# Patient Record
Sex: MALE | Race: WHITE | NOT HISPANIC OR LATINO | URBAN - METROPOLITAN AREA
[De-identification: names, ages, dates, MRNs, and addresses within clinical notes are randomized per-mention and may not be internally consistent; named-entity substitution may affect disease eponyms.]

---

## 2018-04-25 LAB
ALBUMIN SERPL-MCNC: 5 G/DL (ref 3.5–5.5)
ALBUMIN/GLOB SERPL: 2 {RATIO} (ref 1.2–2.2)
ALP SERPL-CCNC: 47 IU/L (ref 39–117)
ALT SERPL-CCNC: 27 IU/L (ref 0–44)
AST SERPL-CCNC: 19 IU/L (ref 0–40)
BILIRUB SERPL-MCNC: 0.8 MG/DL (ref 0–1.2)
BUN SERPL-MCNC: 13 MG/DL (ref 6–24)
BUN/CREAT SERPL: 15 (ref 9–20)
C PEPTIDE SERPL-MCNC: 3.8 NG/ML (ref 1.1–4.4)
CALCIUM SERPL-MCNC: 10 MG/DL (ref 8.7–10.2)
CHLORIDE SERPL-SCNC: 100 MMOL/L (ref 96–106)
CHOLEST SERPL-MCNC: 200 MG/DL (ref 100–199)
CO2 SERPL-SCNC: 23 MMOL/L (ref 18–29)
CREAT SERPL-MCNC: 0.88 MG/DL (ref 0.76–1.27)
GFR SERPLBLD CREATININE-BSD FMLA CKD-EPI: 100 ML/MIN/1.73
GFR SERPLBLD CREATININE-BSD FMLA CKD-EPI: 116 ML/MIN/1.73
GLOBULIN SER CALC-MCNC: 2.5 G/DL (ref 1.5–4.5)
GLUCOSE SERPL-MCNC: 111 MG/DL (ref 65–99)
HBA1C MFR BLD: 5.8 % (ref 4.8–5.6)
HDLC SERPL-MCNC: 33 MG/DL
LABCORP AMBIG ABBREV: NORMAL
LABCORP AMBIG ABBREV: NORMAL
LDLC SERPL CALC-MCNC: 132 MG/DL (ref 0–99)
POTASSIUM SERPL-SCNC: 5.3 MMOL/L (ref 3.5–5.2)
PROT SERPL-MCNC: 7.5 G/DL (ref 6–8.5)
SODIUM SERPL-SCNC: 137 MMOL/L (ref 134–144)
TRIGL SERPL-MCNC: 173 MG/DL (ref 0–149)
VLDLC SERPL CALC-MCNC: 35 MG/DL (ref 5–40)

## 2018-04-26 LAB — CALCIT SERPL-MCNC: <2 PG/ML (ref 0–8.4)

## 2018-04-27 ENCOUNTER — OFFICE VISIT (OUTPATIENT)
Dept: ENDOCRINOLOGY | Facility: CLINIC | Age: 53
End: 2018-04-27
Payer: COMMERCIAL

## 2018-04-27 VITALS
WEIGHT: 291.4 LBS | DIASTOLIC BLOOD PRESSURE: 84 MMHG | RESPIRATION RATE: 18 BRPM | SYSTOLIC BLOOD PRESSURE: 128 MMHG | HEIGHT: 76 IN | BODY MASS INDEX: 35.49 KG/M2 | HEART RATE: 84 BPM

## 2018-04-27 DIAGNOSIS — E11.65 TYPE 2 DIABETES MELLITUS WITH HYPERGLYCEMIA, WITHOUT LONG-TERM CURRENT USE OF INSULIN (CMS/HCC): Primary | ICD-10-CM

## 2018-04-27 DIAGNOSIS — E78.2 MIXED HYPERLIPIDEMIA: ICD-10-CM

## 2018-04-27 PROBLEM — E11.9 TYPE 2 DIABETES MELLITUS (CMS/HCC): Status: ACTIVE | Noted: 2018-04-27

## 2018-04-27 PROBLEM — E78.5 DYSLIPIDEMIA: Status: ACTIVE | Noted: 2018-04-27

## 2018-04-27 LAB — GLUCOSE BLOOD, POC: 108

## 2018-04-27 PROCEDURE — 82962 GLUCOSE BLOOD TEST: CPT

## 2018-04-27 PROCEDURE — 99214 OFFICE O/P EST MOD 30 MIN: CPT

## 2018-04-27 RX ORDER — LANSOPRAZOLE 30 MG/1
30 CAPSULE, DELAYED RELEASE ORAL AS NEEDED
COMMUNITY

## 2018-04-27 RX ORDER — PRAVASTATIN SODIUM 20 MG/1
20 TABLET ORAL DAILY
Qty: 90 TABLET | Refills: 3 | Status: SHIPPED | OUTPATIENT
Start: 2018-04-27 | End: 2019-05-17 | Stop reason: HOSPADM

## 2018-04-27 RX ORDER — PRAVASTATIN SODIUM 10 MG/1
10 TABLET ORAL
COMMUNITY
Start: 2018-01-23 | End: 2018-04-27

## 2018-04-27 RX ORDER — ASPIRIN 81 MG/1
81 TABLET ORAL
COMMUNITY

## 2018-04-27 ASSESSMENT — ENCOUNTER SYMPTOMS
DIARRHEA: 0
CONSTIPATION: 1
TROUBLE SWALLOWING: 0
DIZZINESS: 0
CHEST TIGHTNESS: 0
UNEXPECTED WEIGHT CHANGE: 0
PSYCHIATRIC NEGATIVE: 1
CHOKING: 0
ACTIVITY CHANGE: 1
POLYDIPSIA: 0
PALPITATIONS: 0
NAUSEA: 0
MYALGIAS: 0
LIGHT-HEADEDNESS: 0

## 2018-04-27 NOTE — PROGRESS NOTES
Daily Progress Note           Patient ID: Derek Ivey is a 52 y.o. male.    52 year old male with diabetes mellitus and dyslipidemia  presents for initial consultation    Reviewed pmhx/med rec  Stopped glipizide.  Lost 20 lbs.  Taking trulicity 0.75 mg weekly.  Much improved glucose at home.  Notes some constipation.  Using freestyle remedios  Gets regular exercise, eating low carb diet.    Sees eye doctor yearly, no DR per patient.    Has dyslipidemia, had reaction of fatigue with lipitor, crestor.  Taking pravastatin 10 mg daily.    No history of HTN.    Diagnosed with diabetes mellitus 3 years ago in setting of steroid treatment for disck disease.  Treated initially with metformin not tolerated, with muscular side effects, then diet controlled.    Father has diabetes dx in his 70's.    No history of pancreatitis or thyroid cancer.          1/9/18  A1c 9.4  /29/164/270  urine alb/cr 4.9    4/24/18  A1c 5.8  /33/132/173  Cr/gfr 0.8/>60  Potassium 5.3    The following have been reviewed and updated as appropriate in this visit:      Current Outpatient Prescriptions   Medication Sig Dispense Refill   • aspirin 81 mg enteric coated tablet 81 mg.     • dulaglutide (TRULICITY) 0.75 mg/0.5 mL pen injector inject 0.5 milliliter by subcutaneous route  every week in the abdomen, thigh, or upper arm rotating injection sites     • FREESTYLE REMEDIOS READER misc 4 (four) times a day. for testing  0   • FREESTYLE REMEDIOS SENSOR kit 4 (four) times a day. for testing  3   • lansoprazole (PREVACID) 30 mg capsule 30 mg.     • pravastatin (PRAVACHOL) 10 mg tablet 10 mg.       No current facility-administered medications for this visit.        Review of Systems   Constitutional: Positive for activity change. Negative for unexpected weight change.   HENT: Negative for trouble swallowing.    Eyes: Negative for visual disturbance.   Respiratory: Negative for choking and chest tightness.    Cardiovascular: Negative for chest pain  "and palpitations.   Gastrointestinal: Positive for constipation. Negative for diarrhea and nausea.   Endocrine: Negative for polydipsia and polyuria.   Genitourinary: Negative for decreased urine volume.   Musculoskeletal: Negative for myalgias.   Skin: Negative for rash.   Neurological: Negative for dizziness and light-headedness.   Psychiatric/Behavioral: Negative.            Social History     Social History   • Marital status:      Spouse name: N/A   • Number of children: N/A   • Years of education: N/A     Occupational History   • Not on file.     Social History Main Topics   • Smoking status: Not on file   • Smokeless tobacco: Not on file   • Alcohol use Not on file   • Drug use: Unknown   • Sexual activity: Not on file     Other Topics Concern   • Not on file     Social History Narrative   • No narrative on file     Allergies not on file    Vitals:    04/27/18 0942   BP: 128/84   BP Location: Left upper arm   Patient Position: Sitting   Pulse: 84   Resp: 18   Weight: 132 kg (291 lb 6.4 oz)   Height: 1.93 m (6' 4\")       Objective     Physical Exam   Constitutional: He is oriented to person, place, and time. He appears well-developed.   HENT:   Head: Normocephalic.   Eyes: Pupils are equal, round, and reactive to light.   Neck: No thyromegaly present.   Pulmonary/Chest: Effort normal.   Abdominal: He exhibits no distension.   obese   Musculoskeletal: Normal range of motion.   Neurological: He is oriented to person, place, and time.   Intact sensation feet   Skin: Skin is warm.   No acanthosis   Psychiatric: He has a normal mood and affect.       Assessment/Plan   Type 2 diabetes mellitus (CMS/[facility]) ([facility])  Diabetes mellitus type 2 well controlled, much improved  Discussed home glucose and A1c goals  Rec ophtho, dental, podiatry, nutrition visits  Rec low consistent carb diet outlined, regular exercise, weight loss  Check testing 4 times per day  Trulicity 0.75 mg weekly  Discussed med side effects and " patient accepts risks  Check testing for next visit  Spent 35 min with pt care greater than 3/4 directly with pt        Dyslipidemia  Dyslipidemia much improved  Increase to pravastatin 20 mg daily  Yearly FLP check  Discussed med side effects                Mahin Weinberg MD  4/27/2018

## 2018-04-27 NOTE — ASSESSMENT & PLAN NOTE
Diabetes mellitus type 2 well controlled, much improved  Discussed home glucose and A1c goals  Rec ophtho, dental, podiatry, nutrition visits  Rec low consistent carb diet outlined, regular exercise, weight loss  Check testing 4 times per day  Trulicity 0.75 mg weekly  Discussed med side effects and patient accepts risks  Check testing for next visit  Spent 35 min with pt care greater than 3/4 directly with pt

## 2018-04-27 NOTE — ASSESSMENT & PLAN NOTE
Dyslipidemia much improved  Increase to pravastatin 20 mg daily  Yearly FLP check  Discussed med side effects

## 2018-06-25 ENCOUNTER — TELEPHONE (OUTPATIENT)
Dept: ENDOCRINOLOGY | Facility: CLINIC | Age: 53
End: 2018-06-25

## 2018-06-25 DIAGNOSIS — E11.65 TYPE 2 DIABETES MELLITUS WITH HYPERGLYCEMIA, WITHOUT LONG-TERM CURRENT USE OF INSULIN (CMS/HCC): Primary | ICD-10-CM

## 2018-11-05 DIAGNOSIS — E11.65 TYPE 2 DIABETES MELLITUS WITH HYPERGLYCEMIA, UNSPECIFIED WHETHER LONG TERM INSULIN USE (CMS/HCC): Primary | ICD-10-CM

## 2018-11-05 NOTE — TELEPHONE ENCOUNTER
Pharmacy requesting trulicity refill, 90 day sent electronically.   Pt needs a f/u lachelle with BW.

## 2019-04-24 DIAGNOSIS — E11.65 TYPE 2 DIABETES MELLITUS WITH HYPERGLYCEMIA, WITHOUT LONG-TERM CURRENT USE OF INSULIN (CMS/HCC): ICD-10-CM

## 2019-04-24 RX ORDER — PRAVASTATIN SODIUM 20 MG/1
TABLET ORAL
Qty: 90 TABLET | Refills: 3 | OUTPATIENT
Start: 2019-04-24

## 2019-04-24 NOTE — TELEPHONE ENCOUNTER
Pt last seen on 4/27/18  No f/u lachelle made  Pharmacy requesting rx refill for pravastatin.   Pt needs updated bw with f/u lachelle for any refills.

## 2019-05-05 DIAGNOSIS — E11.65 TYPE 2 DIABETES MELLITUS WITH HYPERGLYCEMIA, UNSPECIFIED WHETHER LONG TERM INSULIN USE (CMS/HCC): Primary | ICD-10-CM

## 2019-05-06 RX ORDER — DULAGLUTIDE 0.75 MG/.5ML
INJECTION, SOLUTION SUBCUTANEOUS
Refills: 0 | OUTPATIENT
Start: 2019-05-06

## 2019-05-15 NOTE — TELEPHONE ENCOUNTER
Called pt and left message informing pt that appt will be rescheduled if labs were not completed. Informed pt that if labs were completed please bring them into the office or have them faxed.

## 2019-05-16 NOTE — TELEPHONE ENCOUNTER
Please call patient back he stated he is returning your call concerning the labs, patient called the service left message

## 2019-05-17 ENCOUNTER — OFFICE VISIT (OUTPATIENT)
Dept: ENDOCRINOLOGY | Facility: CLINIC | Age: 54
End: 2019-05-17
Payer: COMMERCIAL

## 2019-05-17 VITALS
SYSTOLIC BLOOD PRESSURE: 120 MMHG | DIASTOLIC BLOOD PRESSURE: 90 MMHG | HEIGHT: 76 IN | HEART RATE: 91 BPM | WEIGHT: 298 LBS | RESPIRATION RATE: 18 BRPM | BODY MASS INDEX: 36.29 KG/M2 | OXYGEN SATURATION: 98 %

## 2019-05-17 DIAGNOSIS — E78.2 MIXED HYPERLIPIDEMIA: ICD-10-CM

## 2019-05-17 DIAGNOSIS — E11.69 TYPE 2 DIABETES MELLITUS WITH OTHER SPECIFIED COMPLICATION, WITH LONG-TERM CURRENT USE OF INSULIN: Primary | ICD-10-CM

## 2019-05-17 DIAGNOSIS — Z79.4 TYPE 2 DIABETES MELLITUS WITH OTHER SPECIFIED COMPLICATION, WITH LONG-TERM CURRENT USE OF INSULIN: Primary | ICD-10-CM

## 2019-05-17 LAB
ALBUMIN SERPL-MCNC: 4.9 G/DL (ref 3.5–5.5)
ALBUMIN/GLOB SERPL: 2 {RATIO} (ref 1.2–2.2)
ALP SERPL-CCNC: 45 IU/L (ref 39–117)
ALT SERPL-CCNC: 26 IU/L (ref 0–44)
AST SERPL-CCNC: 22 IU/L (ref 0–40)
BILIRUB SERPL-MCNC: 0.7 MG/DL (ref 0–1.2)
BUN SERPL-MCNC: 13 MG/DL (ref 6–24)
BUN/CREAT SERPL: 14 (ref 9–20)
CALCIUM SERPL-MCNC: 10.1 MG/DL (ref 8.7–10.2)
CHLORIDE SERPL-SCNC: 101 MMOL/L (ref 96–106)
CHOLEST SERPL-MCNC: 183 MG/DL (ref 100–199)
CO2 SERPL-SCNC: 24 MMOL/L (ref 20–29)
CREAT SERPL-MCNC: 0.96 MG/DL (ref 0.76–1.27)
GLOBULIN SER CALC-MCNC: 2.5 G/DL (ref 1.5–4.5)
GLUCOSE BLOOD, POC: 125
GLUCOSE SERPL-MCNC: 100 MG/DL (ref 65–99)
HBA1C MFR BLD: 6 % (ref 4.8–5.6)
HDLC SERPL-MCNC: 31 MG/DL
LAB CORP EGFR IF AFRICN AM: 105 ML/MIN/1.73
LAB CORP EGFR IF NONAFRICN AM: 91 ML/MIN/1.73
LDLC SERPL CALC-MCNC: 116 MG/DL (ref 0–99)
POTASSIUM SERPL-SCNC: 4.9 MMOL/L (ref 3.5–5.2)
PROT SERPL-MCNC: 7.4 G/DL (ref 6–8.5)
SODIUM SERPL-SCNC: 140 MMOL/L (ref 134–144)
TRIGL SERPL-MCNC: 179 MG/DL (ref 0–149)
VLDLC SERPL CALC-MCNC: 36 MG/DL (ref 5–40)

## 2019-05-17 PROCEDURE — 99214 OFFICE O/P EST MOD 30 MIN: CPT

## 2019-05-17 PROCEDURE — 82962 GLUCOSE BLOOD TEST: CPT

## 2019-05-17 RX ORDER — FLASH GLUCOSE SCANNING READER
EACH MISCELLANEOUS
Qty: 1 EACH | Refills: 3 | Status: SHIPPED | OUTPATIENT
Start: 2019-05-17

## 2019-05-17 RX ORDER — DULAGLUTIDE 0.75 MG/.5ML
0.75 INJECTION, SOLUTION SUBCUTANEOUS WEEKLY
Qty: 12 PEN | Refills: 3 | Status: SHIPPED | OUTPATIENT
Start: 2019-05-17 | End: 2020-03-19 | Stop reason: SDUPTHER

## 2019-05-17 RX ORDER — FLASH GLUCOSE SENSOR
KIT MISCELLANEOUS
Qty: 2 KIT | Refills: 11 | Status: SHIPPED | OUTPATIENT
Start: 2019-05-17 | End: 2020-03-19 | Stop reason: SDUPTHER

## 2019-05-17 RX ORDER — DULAGLUTIDE 0.75 MG/.5ML
INJECTION, SOLUTION SUBCUTANEOUS
COMMUNITY
Start: 2019-02-12 | End: 2019-05-17 | Stop reason: SDUPTHER

## 2019-05-17 ASSESSMENT — ENCOUNTER SYMPTOMS
PALPITATIONS: 0
UNEXPECTED WEIGHT CHANGE: 1
NAUSEA: 0
CHEST TIGHTNESS: 0
TROUBLE SWALLOWING: 0

## 2019-05-17 NOTE — ASSESSMENT & PLAN NOTE
Diabetes mellitus type 2 improved control still not at goal  Reviewed all diabetic blood testing results  Discussed home glucose and A1c goals  Rec ophtho, dental, podiatry, nutrition visits  Rec low consistent carb diet outlined, regular exercise, weight loss  Glucose testing with freestyle erlin  trulicity 0.75 mg weekly  Discussed med side effects and patient accepts risks  Discussed hypoglycemia symptoms and treatment  No driving if glucose less than 100  Check testing for next visit  Spent 30 min with pt care greater than 50% of time spent counseling/coordinating care, counseling as outlined above  Will make attempt to obtain records from primary physician and relay recommendations to primary physician

## 2019-05-17 NOTE — ASSESSMENT & PLAN NOTE
Dyslipidemia not at goal last visit  Pravastatin 20 mg daily  Yearly FLP check  Discussed med side effects

## 2019-05-18 LAB
ALBUMIN/CREAT UR: 3 MG/G CREAT (ref 0–30)
CREAT UR-MCNC: 166.9 MG/DL
MICROALBUMIN UR-MCNC: 5 UG/ML

## 2019-05-24 ENCOUNTER — TELEPHONE (OUTPATIENT)
Dept: ENDOCRINOLOGY | Facility: CLINIC | Age: 54
End: 2019-05-24

## 2019-05-24 NOTE — TELEPHONE ENCOUNTER
Pt returned call.   Explained pt's test results  Limit carb intake, exercise  Pt gave verbal ok to increase pravastatin to 40 mg

## 2019-05-24 NOTE — TELEPHONE ENCOUNTER
Left detailed message at [phone]  Consent is needed by pt if ok to increase pravastatin to 40 mg, waiting on call back. Results relayed.

## 2019-05-24 NOTE — TELEPHONE ENCOUNTER
----- Message from Mahin Weinberg MD sent at 5/23/2019  5:50 PM EDT -----  pls let pt know a1c 6, from 5.8 last check  LDL cholesterol above goal at 113, would increase to pravastatin 40 mg daily.  He can double dose and I he agrees let me know and I can order pravastatin 40 mg daily when I get back  Other testing normal

## 2019-05-25 RX ORDER — PRAVASTATIN SODIUM 40 MG/1
40 TABLET ORAL DAILY
Qty: 90 TABLET | Refills: 1 | Status: SHIPPED | OUTPATIENT
Start: 2019-05-25 | End: 2019-11-18 | Stop reason: SDUPTHER

## 2019-11-18 RX ORDER — PRAVASTATIN SODIUM 40 MG/1
TABLET ORAL
Qty: 90 TABLET | Refills: 1 | Status: SHIPPED | OUTPATIENT
Start: 2019-11-18 | End: 2020-03-23

## 2020-03-19 DIAGNOSIS — E11.69 TYPE 2 DIABETES MELLITUS WITH OTHER SPECIFIED COMPLICATION, WITH LONG-TERM CURRENT USE OF INSULIN: ICD-10-CM

## 2020-03-19 DIAGNOSIS — Z79.4 TYPE 2 DIABETES MELLITUS WITH OTHER SPECIFIED COMPLICATION, WITH LONG-TERM CURRENT USE OF INSULIN: ICD-10-CM

## 2020-03-19 DIAGNOSIS — E11.65 TYPE 2 DIABETES MELLITUS WITH HYPERGLYCEMIA, UNSPECIFIED WHETHER LONG TERM INSULIN USE (CMS/HCC): ICD-10-CM

## 2020-03-19 RX ORDER — FLASH GLUCOSE SENSOR
KIT MISCELLANEOUS
Qty: 6 KIT | Refills: 0 | Status: SHIPPED | OUTPATIENT
Start: 2020-03-19

## 2020-03-19 RX ORDER — DULAGLUTIDE 0.75 MG/.5ML
0.75 INJECTION, SOLUTION SUBCUTANEOUS WEEKLY
Qty: 12 PEN | Refills: 0 | Status: SHIPPED | OUTPATIENT
Start: 2020-03-19

## 2020-03-19 NOTE — TELEPHONE ENCOUNTER
Pt is scheduled for 3/27/2020. Pt has recent blood work from PCP will bring with him to visit. Pt cannot retrieve  refills left in Dr. Weinberg's name.

## 2020-03-23 RX ORDER — PRAVASTATIN SODIUM 40 MG/1
40 TABLET ORAL
Qty: 90 TABLET | Refills: 0 | Status: SHIPPED | OUTPATIENT
Start: 2020-03-23 | End: 2020-06-21

## 2021-03-31 DIAGNOSIS — Z23 ENCOUNTER FOR IMMUNIZATION: ICD-10-CM

## 2024-06-30 ENCOUNTER — HOSPITAL ENCOUNTER (OUTPATIENT)
Facility: CLINIC | Age: 59
Discharge: HOME | End: 2024-06-30
Attending: EMERGENCY MEDICINE
Payer: COMMERCIAL

## 2024-06-30 VITALS
DIASTOLIC BLOOD PRESSURE: 94 MMHG | RESPIRATION RATE: 18 BRPM | HEART RATE: 86 BPM | OXYGEN SATURATION: 97 % | SYSTOLIC BLOOD PRESSURE: 158 MMHG | TEMPERATURE: 98.4 F

## 2024-06-30 DIAGNOSIS — R05.1 ACUTE COUGH: Primary | ICD-10-CM

## 2024-06-30 PROCEDURE — 99203 OFFICE O/P NEW LOW 30 MIN: CPT | Performed by: NURSE PRACTITIONER

## 2024-06-30 PROCEDURE — S9083 URGENT CARE CENTER GLOBAL: HCPCS | Performed by: NURSE PRACTITIONER

## 2024-06-30 RX ORDER — ALBUTEROL SULFATE 90 UG/1
2 INHALANT RESPIRATORY (INHALATION) EVERY 4 HOURS PRN
Qty: 1 EACH | Refills: 0 | Status: SHIPPED | OUTPATIENT
Start: 2024-06-30 | End: 2024-07-30

## 2024-06-30 RX ORDER — BENZONATATE 100 MG/1
100 CAPSULE ORAL 3 TIMES DAILY PRN
Qty: 30 CAPSULE | Refills: 0 | Status: SHIPPED | OUTPATIENT
Start: 2024-06-30 | End: 2024-07-10

## 2024-06-30 ASSESSMENT — ENCOUNTER SYMPTOMS: COUGH: 1

## 2024-06-30 NOTE — ED ATTESTATION NOTE
The patient was evaluated and managed by the nurse practitioner.  I was present in the office and available for consultation if needed  I have reviewed and agree with the diagnosis and treatment plan.      Melody Vazquez,   06/30/24 1044

## 2024-06-30 NOTE — ED PROVIDER NOTES
Emergency Medicine Note  HPI   HISTORY OF PRESENT ILLNESS     P/w cough x7 days  Cough is mostly nonproductive, worse at night, hears himself wheezing when lying down  Chest is sore/burning from coughing  Denies chest pain, SOB, difficulty breathing  Denies fever or known sick contacts  Taking mucinex, robitussin          Patient History   PAST HISTORY     Reviewed from Nursing Triage:       Past Medical History:   Diagnosis Date    Type 2 diabetes mellitus (CMS/HCC)        No past surgical history on file.    No family history on file.    Social History     Tobacco Use    Smoking status: Never    Smokeless tobacco: Never   Substance Use Topics    Alcohol use: No         Review of Systems   REVIEW OF SYSTEMS     Review of Systems   Respiratory:  Positive for cough.          VITALS     ED Vitals      Date/Time Temp Pulse Resp BP SpO2 [facility]   06/30/24 0930 36.9 °C (98.4 °F) 86 18 158/94 97 % DS                         Physical Exam   PHYSICAL EXAM     Physical Exam  Constitutional:       General: He is not in acute distress.     Appearance: He is not ill-appearing.   HENT:      Head: Normocephalic.      Mouth/Throat:      Mouth: Mucous membranes are moist.      Pharynx: No posterior oropharyngeal erythema.   Cardiovascular:      Rate and Rhythm: Normal rate and regular rhythm.      Heart sounds: Normal heart sounds.   Pulmonary:      Effort: Pulmonary effort is normal. No respiratory distress.      Breath sounds: Normal breath sounds. No wheezing, rhonchi or rales.   Skin:     General: Skin is warm and dry.   Neurological:      General: No focal deficit present.      Mental Status: He is alert.   Psychiatric:         Mood and Affect: Mood normal.         Behavior: Behavior normal.           PROCEDURES     Procedures     DATA     Results       None                No orders to display       Scoring tools                                  ED Course & MDM   MDM / ED COURSE / CLINICAL IMPRESSION / DISPO     Medical Decision  Making  Cough:  -afebrile, lungs CBTA  -suspect viral URI  -tessalon TID PRN  -albuterol prn  -flonase  -f/u PCP if symptoms do not improve           Clinical Impression      Acute cough     _________________       ED Disposition   Discharge                       Jamaica Michael CRNP  06/30/24 0951

## 2024-06-30 NOTE — DISCHARGE INSTRUCTIONS
Your lungs were clear on exam. I prescribed you a cough suppressant (Tessalon) and an albuterol inhaler. Take as prescribed. Start Flonase, 1 spray each nostril twice daily. Follow up with your PCP if symptoms do not improve.